# Patient Record
Sex: MALE | Race: WHITE | Employment: STUDENT | URBAN - METROPOLITAN AREA
[De-identification: names, ages, dates, MRNs, and addresses within clinical notes are randomized per-mention and may not be internally consistent; named-entity substitution may affect disease eponyms.]

---
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
## 2020-06-25 NOTE — CARE COORDINATION
FAMILY COLLATERAL NOTE    Family/Support Name: Dao  Contact #:[phone]  Relationship to Pt[de-identified] mother        Family/Support contact aware of hospitalization:yes  Presenting Symptoms/Current Concerns: I noticed his depression starting senior year of [employer]. He does not talk to me much about it. If he drinks he goes from happy to despair. Mom provided patient with a book on depression and this started the talking appmt. He did go to see his PCP but encouraged patient to see a pre scriber in [place]. This never happened. Spring break when he was home his GF broke up with him due to not wanting a long distance relationship. I encouraged him to get help and we called and left messages at [employer] to have him see someone. When he did end up going he walked in and there was a note on the door stating they were not there. He Has never been diagnosed or seen for depression. Never told me he wanted to kill mor hurt himself currently or in the past. He has never told me he had thoughts like that about other. He reads a lot about politics and the environment a lot. He did not go to school the day after Trump was elected. Mom saw him 2x yesterday and he seemed hesitant the first time and a little dazed and I came back in the evening. He seemed more himself. He was tapping the table lot and Im not sure if this was a side effect. Less Depression. Today he called me and we reviewed his plan for school and he wanted to go to a concert Wed. Night and I told him to slow down. Top 3 Life Stressors: 1. Course load and school work - main stressor 2. Break up with GF 3. Feels like he has no friends at the [employer]        Background History Relevant to Current Hospitalization:    Last Fall his first semester at [employer] he told his sister he did not leave his room or go to classes for a week.        Family Mental Health/Substance Use History: Mom and sister both have depression. Support Network's Goal for Hospitalization: to finish semester and there was a meeting with [employer] [facility] [employer] [facility] [employer] to see if he can finish semester . He is able to finish the semester and the plan is for him to come back home. Mom will pick him up on Wed, on discharge and take him back to the [facility]. Call in scripts to [facility] in [place]. Discharge Plan: to return to [place] to finish this semester and return home. Mom would like counseling apptmt scheduled for [place] and apptmt with pre scriber there if  Patient will run out of meds before he return home. Mom will gather info for a prescriber in [facility] and will call back for us to schedule an apptmt. Support Network Supportive of Discharge Plan: yes      Support can confirm Safety of Location and Security of Weapons: no weapons in the home      Support agreeable to Safeguard and Monitor Medications (including Prescription and OTC): I think he will continue to take his medications when he leaves. Identified Barriers to Compliance with Discharge Plan: none known     Recommendations for Support Network: Call [facility] if any questions after discharge.    Electronically signed by Goldie Costa [facility] on 4/29/2019 at 2:08 PM        Goldie Costa [facility] 25-Jun-2020 16:57